# Patient Record
Sex: MALE | Race: WHITE
[De-identification: names, ages, dates, MRNs, and addresses within clinical notes are randomized per-mention and may not be internally consistent; named-entity substitution may affect disease eponyms.]

---

## 2018-12-21 ENCOUNTER — HOSPITAL ENCOUNTER (EMERGENCY)
Dept: HOSPITAL 26 - MED | Age: 38
Discharge: HOME | End: 2018-12-21
Payer: COMMERCIAL

## 2018-12-21 VITALS — HEIGHT: 67 IN | BODY MASS INDEX: 28.74 KG/M2 | WEIGHT: 183.12 LBS

## 2018-12-21 VITALS — DIASTOLIC BLOOD PRESSURE: 73 MMHG | SYSTOLIC BLOOD PRESSURE: 120 MMHG

## 2018-12-21 VITALS — SYSTOLIC BLOOD PRESSURE: 129 MMHG | DIASTOLIC BLOOD PRESSURE: 70 MMHG

## 2018-12-21 DIAGNOSIS — Z79.01: ICD-10-CM

## 2018-12-21 DIAGNOSIS — T82.847A: Primary | ICD-10-CM

## 2018-12-21 DIAGNOSIS — G89.18: ICD-10-CM

## 2018-12-21 LAB
ALBUMIN FLD-MCNC: 4.2 G/DL (ref 3.4–5)
ANION GAP SERPL CALCULATED.3IONS-SCNC: 8 MMOL/L (ref 8–16)
AST SERPL-CCNC: 28 U/L (ref 15–37)
BARBITURATES UR QL SCN: (no result) NG/ML
BASOPHILS # BLD AUTO: 0 K/UL (ref 0–0.22)
BASOPHILS NFR BLD AUTO: 0.4 % (ref 0–2)
BENZODIAZ UR QL SCN: (no result) NG/ML
BILIRUB SERPL-MCNC: 0.5 MG/DL (ref 0–1)
BUN SERPL-MCNC: 16 MG/DL (ref 7–18)
BZE UR QL SCN: (no result) NG/ML
CANNABINOIDS UR QL SCN: (no result) NG/ML
CHLORIDE SERPL-SCNC: 105 MMOL/L (ref 98–107)
CO2 SERPL-SCNC: 30.9 MMOL/L (ref 21–32)
CREAT SERPL-MCNC: 1 MG/DL (ref 0.7–1.3)
DEPRECATED D DIMER PPP-ACNC: < 100 NG/ML (ref 0–400)
EOSINOPHIL # BLD AUTO: 0.2 K/UL (ref 0–0.4)
EOSINOPHIL NFR BLD AUTO: 3.6 % (ref 0–4)
ERYTHROCYTE [DISTWIDTH] IN BLOOD BY AUTOMATED COUNT: 13.9 % (ref 11.6–13.7)
GFR SERPL CREATININE-BSD FRML MDRD: 108 ML/MIN (ref 90–?)
GLUCOSE SERPL-MCNC: 126 MG/DL (ref 74–106)
HCT VFR BLD AUTO: 47.2 % (ref 36–52)
HGB BLD-MCNC: 15.4 G/DL (ref 12–18)
LYMPHOCYTES # BLD AUTO: 1.6 K/UL (ref 2–11.5)
LYMPHOCYTES NFR BLD AUTO: 36 % (ref 20.5–51.1)
MCH RBC QN AUTO: 28 PG (ref 27–31)
MCHC RBC AUTO-ENTMCNC: 33 G/DL (ref 33–37)
MCV RBC AUTO: 86.8 FL (ref 80–94)
MONOCYTES # BLD AUTO: 0.4 K/UL (ref 0.8–1)
MONOCYTES NFR BLD AUTO: 9.1 % (ref 1.7–9.3)
NEUTROPHILS # BLD AUTO: 2.3 K/UL (ref 1.8–7.7)
NEUTROPHILS NFR BLD AUTO: 50.9 % (ref 42.2–75.2)
OPIATES UR QL SCN: (no result) NG/ML
PCP UR QL SCN: (no result) NG/ML
PLATELET # BLD AUTO: 194 K/UL (ref 140–450)
POTASSIUM SERPL-SCNC: 3.9 MMOL/L (ref 3.5–5.1)
PROTHROMBIN TIME: 12.4 SECS (ref 10.8–13.4)
RBC # BLD AUTO: 5.43 MIL/UL (ref 4.2–6.1)
SODIUM SERPL-SCNC: 140 MMOL/L (ref 136–145)
WBC # BLD AUTO: 4.5 K/UL (ref 4.8–10.8)

## 2018-12-21 NOTE — NUR
PATIENT PRESENTS TO ED WITH C/O CHEST DISCOMFORT X3 DAYS, WENT TO [place] 
WITH CARDIAC WORKUP ON 10/20/18. PT WANTS TO KNOW THE RESULT. AAOX4 WITH EVEN 
AND STEADY GAIT; LUNGS CLEAR BL; HR EVEN AND REGULAR; PT DENIES ANY FEVER, SOB, 
OR COUGH AT THIS TIME; DENIES N/V/D; SKIN IS PINK/WARM/DRY; PATIENT STATES 
TINGING CHEST PAIN 3/10 AT THIS TIME; VSS; PATIENT POSITIONED FOR COMFORT; HOB 
ELEVATED; BEDRAILS UP X2; BED DOWN. ER MD MADE AWARE OF PT STATUS.